# Patient Record
Sex: MALE | Race: WHITE | Employment: OTHER | URBAN - NONMETROPOLITAN AREA
[De-identification: names, ages, dates, MRNs, and addresses within clinical notes are randomized per-mention and may not be internally consistent; named-entity substitution may affect disease eponyms.]

---

## 2018-09-09 ENCOUNTER — HOSPITAL ENCOUNTER (EMERGENCY)
Facility: HOSPITAL | Age: 73
Discharge: HOME | End: 2018-09-09
Attending: NURSE PRACTITIONER | Admitting: NURSE PRACTITIONER
Payer: MEDICARE

## 2018-09-09 VITALS
BODY MASS INDEX: 23.7 KG/M2 | RESPIRATION RATE: 16 BRPM | TEMPERATURE: 97.4 F | SYSTOLIC BLOOD PRESSURE: 159 MMHG | WEIGHT: 160 LBS | DIASTOLIC BLOOD PRESSURE: 76 MMHG | HEIGHT: 69 IN | HEART RATE: 67 BPM | OXYGEN SATURATION: 97 %

## 2018-09-09 DIAGNOSIS — J40 BRONCHITIS: ICD-10-CM

## 2018-09-09 PROBLEM — M45.9 ANKYLOSING SPONDYLITIS, UNSPECIFIED SITE OF SPINE (H): Chronic | Status: ACTIVE | Noted: 2018-09-09

## 2018-09-09 PROCEDURE — 99203 OFFICE O/P NEW LOW 30 MIN: CPT | Performed by: NURSE PRACTITIONER

## 2018-09-09 PROCEDURE — G0463 HOSPITAL OUTPT CLINIC VISIT: HCPCS

## 2018-09-09 RX ORDER — PREDNISONE 20 MG/1
TABLET ORAL
Qty: 6 TABLET | Refills: 0 | Status: SHIPPED | OUTPATIENT
Start: 2018-09-09

## 2018-09-09 RX ORDER — AZITHROMYCIN 250 MG/1
TABLET, FILM COATED ORAL
Qty: 6 TABLET | Refills: 0 | Status: SHIPPED | OUTPATIENT
Start: 2018-09-09 | End: 2018-09-14

## 2018-09-09 ASSESSMENT — ENCOUNTER SYMPTOMS
FEVER: 0
RHINORRHEA: 1
SINUS PRESSURE: 0
COUGH: 1
ABDOMINAL PAIN: 0
SHORTNESS OF BREATH: 0
WHEEZING: 0
SINUS PAIN: 0
APPETITE CHANGE: 0

## 2018-09-09 NOTE — DISCHARGE INSTRUCTIONS

## 2018-09-09 NOTE — ED PROVIDER NOTES
History     Chief Complaint   Patient presents with     Cough     X 1 week, no fevers, scant clear sputum. Patient denies SOB     The history is provided by the patient and the spouse. No  was used.     Arnav Blanco is a 73 year old male who presents with a cough for 1 week. Thought it was allergies but continues to get worse. Eating and drinking ok, no fever. No sore throat. No SOB or CP. Seems like his cough is worse with deep breathing. No change in energy, sleeping okay. History of pneumonia in the past, is complicated with his history of ankylosing spondylitis.     Problem List:    Patient Active Problem List    Diagnosis Date Noted     Ankylosing spondylitis, unspecified site of spine (H) 09/09/2018     Priority: Medium     Pneumonia 06/20/2014     Priority: Medium     Headache 04/15/2014     Priority: Medium     Problem list name updated by automated process. Provider to review          Past Medical History:    No past medical history on file.    Past Surgical History:    No past surgical history on file.    Family History:    No family history on file.    Social History:  Marital Status:   [2]  Social History   Substance Use Topics     Smoking status: Never Smoker     Smokeless tobacco: Not on file     Alcohol use Not on file        Medications:      adalimumab (HUMIRA PEN) 40 MG/0.8ML pen kit   azithromycin (ZITHROMAX Z-GIOVANI) 250 MG tablet   calcium citrate-vitamin D (CITRACAL) 315-250 MG-UNIT TABS   CELECOXIB PO   hydrochlorothiazide (HYDRODIURIL) 25 MG tablet   LISINOPRIL PO   OMEPRAZOLE PO   predniSONE (DELTASONE) 20 MG tablet         Review of Systems   Constitutional: Negative for appetite change and fever.   HENT: Positive for congestion and rhinorrhea. Negative for sinus pain and sinus pressure.    Respiratory: Positive for cough. Negative for shortness of breath and wheezing.    Cardiovascular: Negative for chest pain.   Gastrointestinal: Negative for abdominal pain.  "  Musculoskeletal:        No new or unusual pain       Physical Exam   BP: 159/76  Pulse: 67  Temp: 97.4  F (36.3  C)  Resp: 16  Height: 175.3 cm (5' 9\")  Weight: 72.6 kg (160 lb)  SpO2: 97 %      Physical Exam   Constitutional: He is oriented to person, place, and time. He appears well-developed and well-nourished. No distress.   HENT:   Head: Normocephalic and atraumatic.   Right Ear: Tympanic membrane and external ear normal.   Left Ear: Tympanic membrane and external ear normal.   Nose: Nose normal.   Mouth/Throat: Uvula is midline, oropharynx is clear and moist and mucous membranes are normal. No oropharyngeal exudate.   Eyes: Conjunctivae and lids are normal. No scleral icterus.   Neck: Normal range of motion. Neck supple.   Cardiovascular: Normal rate, regular rhythm and normal heart sounds.    Pulmonary/Chest: Effort normal and breath sounds normal.   Lymphadenopathy:     He has no cervical adenopathy.   Neurological: He is alert and oriented to person, place, and time.   Skin: Skin is warm and dry. He is not diaphoretic.   Psychiatric: He has a normal mood and affect.   Nursing note and vitals reviewed.      ED Course     ED Course     Procedures     No results found for this or any previous visit (from the past 24 hour(s)).    Medications - No data to display    Assessments & Plan (with Medical Decision Making)     I have reviewed the nursing notes.  I have reviewed the findings, diagnosis, plan and need for follow up with the patient.  Afebrile, benign exam.   Will treat d/t duration and worsening symptoms.   Advised close follow up.  Given Epic educational materials.   Return here if sx worse.     New Prescriptions    AZITHROMYCIN (ZITHROMAX Z-GIOVANI) 250 MG TABLET    Two tablets on the first day, then one tablet daily for the next 4 days    PREDNISONE (DELTASONE) 20 MG TABLET    Take two tablets (= 40mg) each day for 3 days       Final diagnoses:   Bronchitis       9/9/2018   [facility]   "   Cynthia Umanzor, JENNIE  09/09/18 2094

## 2018-09-09 NOTE — ED AVS SNAPSHOT
[facility]    [address] [address]    Phone:  [phone]                                       Arnav Blanco   MRN: [id number]    Department:  [facility]   Date of Visit:  9/9/2018           After Visit Summary Signature Page     I have received my discharge instructions, and my questions have been answered. I have discussed any challenges I see with this plan with the nurse or doctor.    ..........................................................................................................................................  Patient/Patient Representative Signature      ..........................................................................................................................................  Patient Representative Print Name and Relationship to Patient    ..................................................               ................................................  Date                                            Time    ..........................................................................................................................................  Reviewed by Signature/Title    ...................................................              ..............................................  Date                                                            Time          22EPIC Rev 08/18

## 2018-09-09 NOTE — ED TRIAGE NOTES
Pt presents today with c/o cough x1 week with clear sputum. Pt states he feels like it is getting worse. Denies fevers.